# Patient Record
Sex: FEMALE | Race: BLACK OR AFRICAN AMERICAN | NOT HISPANIC OR LATINO | Employment: FULL TIME | URBAN - METROPOLITAN AREA
[De-identification: names, ages, dates, MRNs, and addresses within clinical notes are randomized per-mention and may not be internally consistent; named-entity substitution may affect disease eponyms.]

---

## 2022-05-31 ENCOUNTER — HOSPITAL ENCOUNTER (EMERGENCY)
Facility: HOSPITAL | Age: 32
Discharge: LEFT BEFORE DISCHARGE | End: 2022-05-31
Attending: EMERGENCY MEDICINE
Payer: MEDICAID

## 2022-05-31 VITALS
SYSTOLIC BLOOD PRESSURE: 162 MMHG | TEMPERATURE: 99 F | DIASTOLIC BLOOD PRESSURE: 94 MMHG | RESPIRATION RATE: 18 BRPM | OXYGEN SATURATION: 99 % | HEART RATE: 88 BPM

## 2022-05-31 DIAGNOSIS — R73.9 HYPERGLYCEMIA: ICD-10-CM

## 2022-05-31 DIAGNOSIS — N30.00 ACUTE CYSTITIS WITHOUT HEMATURIA: Primary | ICD-10-CM

## 2022-05-31 DIAGNOSIS — R07.9 CHEST PAIN: ICD-10-CM

## 2022-05-31 LAB
ALBUMIN SERPL BCP-MCNC: 3.3 G/DL (ref 3.5–5.2)
ALLENS TEST: ABNORMAL
ALP SERPL-CCNC: 76 U/L (ref 55–135)
ALT SERPL W/O P-5'-P-CCNC: 8 U/L (ref 10–44)
ANION GAP SERPL CALC-SCNC: 8 MMOL/L (ref 8–16)
AST SERPL-CCNC: 10 U/L (ref 10–40)
B-OH-BUTYR BLD STRIP-SCNC: 0.1 MMOL/L (ref 0–0.5)
BACTERIA #/AREA URNS AUTO: ABNORMAL /HPF
BASOPHILS # BLD AUTO: 0.04 K/UL (ref 0–0.2)
BASOPHILS NFR BLD: 0.7 % (ref 0–1.9)
BILIRUB SERPL-MCNC: 0.2 MG/DL (ref 0.1–1)
BILIRUB UR QL STRIP: NEGATIVE
BUN SERPL-MCNC: 17 MG/DL (ref 6–20)
CALCIUM SERPL-MCNC: 8.7 MG/DL (ref 8.7–10.5)
CHLORIDE SERPL-SCNC: 102 MMOL/L (ref 95–110)
CLARITY UR REFRACT.AUTO: ABNORMAL
CO2 SERPL-SCNC: 25 MMOL/L (ref 23–29)
COLOR UR AUTO: YELLOW
CREAT SERPL-MCNC: 1 MG/DL (ref 0.5–1.4)
DELSYS: ABNORMAL
DIFFERENTIAL METHOD: ABNORMAL
EOSINOPHIL # BLD AUTO: 0.1 K/UL (ref 0–0.5)
EOSINOPHIL NFR BLD: 2 % (ref 0–8)
ERYTHROCYTE [DISTWIDTH] IN BLOOD BY AUTOMATED COUNT: 10.6 % (ref 11.5–14.5)
EST. GFR  (AFRICAN AMERICAN): >60 ML/MIN/1.73 M^2
EST. GFR  (NON AFRICAN AMERICAN): >60 ML/MIN/1.73 M^2
GLUCOSE SERPL-MCNC: 358 MG/DL (ref 70–110)
GLUCOSE UR QL STRIP: ABNORMAL
HCO3 UR-SCNC: 26.9 MMOL/L (ref 24–28)
HCT VFR BLD AUTO: 32.5 % (ref 37–48.5)
HGB BLD-MCNC: 11.9 G/DL (ref 12–16)
HGB UR QL STRIP: ABNORMAL
HYALINE CASTS UR QL AUTO: 3 /LPF
IMM GRANULOCYTES # BLD AUTO: 0.03 K/UL (ref 0–0.04)
IMM GRANULOCYTES NFR BLD AUTO: 0.5 % (ref 0–0.5)
KETONES UR QL STRIP: NEGATIVE
LEUKOCYTE ESTERASE UR QL STRIP: ABNORMAL
LYMPHOCYTES # BLD AUTO: 1.9 K/UL (ref 1–4.8)
LYMPHOCYTES NFR BLD: 34.4 % (ref 18–48)
MCH RBC QN AUTO: 33.7 PG (ref 27–31)
MCHC RBC AUTO-ENTMCNC: 36.6 G/DL (ref 32–36)
MCV RBC AUTO: 92 FL (ref 82–98)
MICROSCOPIC COMMENT: ABNORMAL
MONOCYTES # BLD AUTO: 0.5 K/UL (ref 0.3–1)
MONOCYTES NFR BLD: 8.4 % (ref 4–15)
NEUTROPHILS # BLD AUTO: 3 K/UL (ref 1.8–7.7)
NEUTROPHILS NFR BLD: 54 % (ref 38–73)
NITRITE UR QL STRIP: NEGATIVE
NRBC BLD-RTO: 0 /100 WBC
PCO2 BLDA: 50 MMHG (ref 35–45)
PH SMN: 7.34 [PH] (ref 7.35–7.45)
PH UR STRIP: 6 [PH] (ref 5–8)
PLATELET # BLD AUTO: 355 K/UL (ref 150–450)
PMV BLD AUTO: 10 FL (ref 9.2–12.9)
PO2 BLDA: 23 MMHG (ref 40–60)
POC BE: 1 MMOL/L
POC SATURATED O2: 35 % (ref 95–100)
POC TCO2: 28 MMOL/L (ref 24–29)
POCT GLUCOSE: 435 MG/DL (ref 70–110)
POTASSIUM SERPL-SCNC: 3.8 MMOL/L (ref 3.5–5.1)
PROT SERPL-MCNC: 6.6 G/DL (ref 6–8.4)
PROT UR QL STRIP: ABNORMAL
RBC # BLD AUTO: 3.53 M/UL (ref 4–5.4)
RBC #/AREA URNS AUTO: 5 /HPF (ref 0–4)
SAMPLE: ABNORMAL
SITE: ABNORMAL
SODIUM SERPL-SCNC: 135 MMOL/L (ref 136–145)
SP GR UR STRIP: 1.02 (ref 1–1.03)
SQUAMOUS #/AREA URNS AUTO: 1 /HPF
URN SPEC COLLECT METH UR: ABNORMAL
WBC # BLD AUTO: 5.61 K/UL (ref 3.9–12.7)
WBC #/AREA URNS AUTO: 20 /HPF (ref 0–5)
YEAST UR QL AUTO: ABNORMAL

## 2022-05-31 PROCEDURE — 99900035 HC TECH TIME PER 15 MIN (STAT)

## 2022-05-31 PROCEDURE — 93005 ELECTROCARDIOGRAM TRACING: CPT

## 2022-05-31 PROCEDURE — 85025 COMPLETE CBC W/AUTO DIFF WBC: CPT | Performed by: NURSE PRACTITIONER

## 2022-05-31 PROCEDURE — 99285 EMERGENCY DEPT VISIT HI MDM: CPT | Mod: ,,, | Performed by: EMERGENCY MEDICINE

## 2022-05-31 PROCEDURE — 82010 KETONE BODYS QUAN: CPT | Performed by: NURSE PRACTITIONER

## 2022-05-31 PROCEDURE — 96360 HYDRATION IV INFUSION INIT: CPT

## 2022-05-31 PROCEDURE — 99284 EMERGENCY DEPT VISIT MOD MDM: CPT | Mod: 25

## 2022-05-31 PROCEDURE — 87186 SC STD MICRODIL/AGAR DIL: CPT | Performed by: NURSE PRACTITIONER

## 2022-05-31 PROCEDURE — 87088 URINE BACTERIA CULTURE: CPT | Performed by: NURSE PRACTITIONER

## 2022-05-31 PROCEDURE — 80053 COMPREHEN METABOLIC PANEL: CPT | Performed by: NURSE PRACTITIONER

## 2022-05-31 PROCEDURE — 87086 URINE CULTURE/COLONY COUNT: CPT | Performed by: NURSE PRACTITIONER

## 2022-05-31 PROCEDURE — 82803 BLOOD GASES ANY COMBINATION: CPT

## 2022-05-31 PROCEDURE — 81001 URINALYSIS AUTO W/SCOPE: CPT | Performed by: NURSE PRACTITIONER

## 2022-05-31 PROCEDURE — 87077 CULTURE AEROBIC IDENTIFY: CPT | Performed by: NURSE PRACTITIONER

## 2022-05-31 PROCEDURE — 82962 GLUCOSE BLOOD TEST: CPT

## 2022-05-31 PROCEDURE — 25000003 PHARM REV CODE 250: Performed by: NURSE PRACTITIONER

## 2022-05-31 PROCEDURE — 99285 PR EMERGENCY DEPT VISIT,LEVEL V: ICD-10-PCS | Mod: ,,, | Performed by: EMERGENCY MEDICINE

## 2022-05-31 RX ORDER — METFORMIN HYDROCHLORIDE 500 MG/1
500 TABLET ORAL 2 TIMES DAILY WITH MEALS
COMMUNITY

## 2022-05-31 RX ORDER — INSULIN GLARGINE 100 [IU]/ML
25 INJECTION, SOLUTION SUBCUTANEOUS DAILY
Qty: 7.5 ML | Refills: 0 | Status: SHIPPED | OUTPATIENT
Start: 2022-05-31 | End: 2022-06-30

## 2022-05-31 RX ORDER — GLIPIZIDE 5 MG/1
5 TABLET, FILM COATED, EXTENDED RELEASE ORAL
COMMUNITY

## 2022-05-31 RX ORDER — NITROFURANTOIN 25; 75 MG/1; MG/1
100 CAPSULE ORAL 2 TIMES DAILY
Qty: 10 CAPSULE | Refills: 0 | Status: SHIPPED | OUTPATIENT
Start: 2022-05-31 | End: 2022-06-05

## 2022-05-31 RX ADMIN — SODIUM CHLORIDE 1000 ML: 0.9 INJECTION, SOLUTION INTRAVENOUS at 06:05

## 2022-05-31 NOTE — ED NOTES
Patient identifiers verified and correct for MS Mcgrath  C/C: RIght sided CP SEE NN  APPEARANCE: awake and alert in NAD.  SKIN: warm, dry and intact. No breakdown or bruising.  MUSCULOSKELETAL: Patient moving all extremities spontaneously, no obvious swelling or deformities noted. Ambulates independently.  RESPIRATORY: Denies shortness of breath.Respirations unlabored.   CARDIAC: Denies CP, 2+ distal pulses; no peripheral edema  ABDOMEN: S/ND/NT, Denies nausea  : voids spontaneously, denies difficulty  Neurologic: AAO x 4; follows commands equal strength in all extremities; denies numbness/tingling. Denies dizziness  Denies weakness

## 2022-05-31 NOTE — ED NOTES
Patient identifiers verified and correct for MS Mcgrath  C/C: Right CP, elevated BG SEE NN  APPEARANCE: awake and alert in NAD.  SKIN: warm, dry and intact. No breakdown or bruising.  MUSCULOSKELETAL: Patient moving all extremities spontaneously, no obvious swelling or deformities noted. Ambulates independently.  RESPIRATORY: Denies shortness of breath.Respirations unlabored.   CARDIAC:Positive right  CP, 2+ distal pulses; no peripheral edema  ABDOMEN: ABdomen soft, reports nasuea, emesis x1 today   : voids spontaneously, denies difficulty  Neurologic: AAO x 4; follows commands equal strength in all extremities; denies numbness/tingling. Denies dizziness  Denies weakness

## 2022-05-31 NOTE — FIRST PROVIDER EVALUATION
Emergency Department TeleTriage Encounter Note      CHIEF COMPLAINT    Chief Complaint   Patient presents with    Chest Pain     Chest wall pain with cough that began about an hour ago, hyperglycemic with EMS. She reports she has been out of her insulin for about 3 days       VITAL SIGNS   Initial Vitals [05/31/22 1802]   BP Pulse Resp Temp SpO2   (!) 162/94 88 18 99 °F (37.2 °C) 99 %      MAP       --            ALLERGIES    Review of patient's allergies indicates:  No Known Allergies    PROVIDER TRIAGE NOTE  This is a teletriage evaluation of a 31 y.o. female presenting to the ED complaining of CP and cough starting today. Has been out of insulin for three days.  Glucose >400.     Will start DKA orders.     Initial orders will be placed and care will be transferred to an alternate provider when patient is roomed for a full evaluation. Any additional orders and the final disposition will be determined by that provider.           ORDERS  Labs Reviewed   CBC W/ AUTO DIFFERENTIAL   COMPREHENSIVE METABOLIC PANEL   BETA - HYDROXYBUTYRATE, SERUM   URINALYSIS, REFLEX TO URINE CULTURE   POCT URINE PREGNANCY   POCT GLUCOSE MONITORING CONTINUOUS   POCT GLUCOSE MONITORING CONTINUOUS       ED Orders (720h ago, onward)    Start Ordered     Status Ordering Provider    05/31/22 2000 05/31/22 1823  Vital signs  Every 2 hours         Ordered BUTCH PADILLA N.    05/31/22 1830 05/31/22 1823  sodium chloride 0.9% bolus 1,000 mL  ED 1 Time         Ordered BUTCH PADILLA N.    05/31/22 1824 05/31/22 1823  Cardiac Monitoring - Adult  Continuous         Ordered BUTCH PADILLA N.    05/31/22 1824 05/31/22 1823  Pulse Oximetry Continuous  Continuous         Ordered BUTCH PADILLA N.    05/31/22 1824 05/31/22 1823  Saline lock IV  Once         Ordered BUTCH PADILLA N.    05/31/22 1824 05/31/22 1823  CBC auto differential  STAT         Ordered BUTCH PADILLA N.    05/31/22 1824 05/31/22 1823   Comprehensive metabolic panel  STAT         Ordered BUTCH PADILLA N.    05/31/22 1824 05/31/22 1823  Beta - Hydroxybutyrate, Serum  STAT         Ordered BUTCH PADILLA N.    05/31/22 1824 05/31/22 1823  POCT glucose  Once         Ordered BUTCH PADILLA N.    05/31/22 1824 05/31/22 1823  Urinalysis, Reflex to Urine Culture Urine, Clean Catch  STAT         Ordered BUTCH PADILLA N.    05/31/22 1824 05/31/22 1823  EKG 12-lead  Once         Ordered BUTCH PADILLA N.    05/31/22 1824 05/31/22 1823  X-Ray Chest AP Portable  1 time imaging         Ordered BUTCH PADILLA N.    05/31/22 1824 05/31/22 1823  POCT Venous Blood Gas Once  Once        Comments: This test should be used for VBGs.  If using this order for other tests (K, creatinine, HCT, PT/INR, lactate etc)  ONLY do so in the case of an emergency or rapid response.Notify Physician if: see parameters below.      Ordered BUTCH PADILLA N.    05/31/22 1824 05/31/22 1823  POCT urine pregnancy  Once         Ordered BUTCH PADILLA N.    05/31/22 1820 05/31/22 1819  POCT glucose  Once         Ordered APOLINAR LÓPEZ    05/31/22 1806 05/31/22 1805  EKG 12-lead  Once         Acknowledged APOLINAR LÓPEZ            Virtual Visit Note: The provider triage portion of this emergency department evaluation and documentation was performed via [employer], a HIPAA-compliant telemedicine application, in concert with a tele-presenter in the room. A face to face patient evaluation with one of my colleagues will occur once the patient is placed in an emergency department room.      DISCLAIMER: This note was prepared with [employer] voice recognition transcription software. Garbled syntax, mangled pronouns, and other bizarre constructions may be attributed to that software system.

## 2022-05-31 NOTE — ED PROVIDER NOTES
Encounter Date: 5/31/2022       History     Chief Complaint   Patient presents with    Chest Pain     Chest wall pain with cough that began about an hour ago, hyperglycemic with EMS. She reports she has been out of her insulin for about 3 days     A 31-year-old female past medical history of diabetes presenting today with chest pain and cough that started approximately 1 hour ago while at work.  Pain was located in the right side of the chest which radiated under her arm.  States the pain was constant, sharp stabbing, worse with movement, better with rest.  There were called EMS, they gave her IV fluids and she reported improvement in symptoms.  she reports her sugars have been elevated, has not taken her insulin in 3 days.  No recent fevers or chills.        Review of patient's allergies indicates:  No Known Allergies  Past Medical History:   Diagnosis Date    Diabetes mellitus      History reviewed. No pertinent surgical history.  History reviewed. No pertinent family history.  Social History     Tobacco Use    Smoking status: Never Smoker    Smokeless tobacco: Never Used   Substance Use Topics    Alcohol use: Not Currently    Drug use: Yes     Types: Marijuana     Comment: none x 3 days     Review of Systems   Constitutional: Negative for chills and fever.   HENT: Negative for sore throat.    Respiratory: Positive for cough. Negative for shortness of breath.    Cardiovascular: Positive for chest pain. Negative for palpitations.   Gastrointestinal: Negative for abdominal pain, nausea and vomiting.   Genitourinary: Negative for dysuria and hematuria.   Musculoskeletal: Negative for back pain.   Skin: Negative for rash.   Neurological: Positive for weakness.   Hematological: Does not bruise/bleed easily.       Physical Exam     Initial Vitals [05/31/22 1802]   BP Pulse Resp Temp SpO2   (!) 162/94 88 18 99 °F (37.2 °C) 99 %      MAP       --         Physical Exam    Nursing note and vitals  reviewed.  Constitutional: She appears well-developed and well-nourished. No distress.   HENT:   Mouth/Throat: Oropharynx is clear and moist.   Eyes: Conjunctivae are normal. No scleral icterus.   Neck: No JVD present.   Cardiovascular: Normal rate, regular rhythm and intact distal pulses.   Pulmonary/Chest: Breath sounds normal. No respiratory distress. She has no wheezes. She has no rales. She exhibits no tenderness.   Abdominal: Abdomen is soft. Bowel sounds are normal. She exhibits no distension. There is no abdominal tenderness. There is no rebound.   Musculoskeletal:         General: No edema.     Lymphadenopathy:     She has no cervical adenopathy.   Neurological: She is alert and oriented to person, place, and time.   Skin: Skin is warm. No rash noted.         ED Course   Procedures  Labs Reviewed   CBC W/ AUTO DIFFERENTIAL - Abnormal; Notable for the following components:       Result Value    RBC 3.53 (*)     Hemoglobin 11.9 (*)     Hematocrit 32.5 (*)     MCH 33.7 (*)     MCHC 36.6 (*)     RDW 10.6 (*)     All other components within normal limits   COMPREHENSIVE METABOLIC PANEL - Abnormal; Notable for the following components:    Sodium 135 (*)     Glucose 358 (*)     Albumin 3.3 (*)     ALT 8 (*)     All other components within normal limits   URINALYSIS, REFLEX TO URINE CULTURE - Abnormal; Notable for the following components:    Appearance, UA Hazy (*)     Protein, UA 2+ (*)     Glucose, UA 3+ (*)     Occult Blood UA 1+ (*)     Leukocytes, UA 1+ (*)     All other components within normal limits    Narrative:     Specimen Source->Urine   URINALYSIS MICROSCOPIC - Abnormal; Notable for the following components:    RBC, UA 5 (*)     WBC, UA 20 (*)     Bacteria Few (*)     Hyaline Casts, UA 3 (*)     All other components within normal limits    Narrative:     Specimen Source->Urine   POCT GLUCOSE - Abnormal; Notable for the following components:    POCT Glucose 435 (*)     All other components within  normal limits   ISTAT PROCEDURE - Abnormal; Notable for the following components:    POC PH 7.339 (*)     POC PCO2 50.0 (*)     POC PO2 23 (*)     POC SATURATED O2 35 (*)     All other components within normal limits   CULTURE, URINE   BETA - HYDROXYBUTYRATE, SERUM   POCT URINE PREGNANCY   POCT GLUCOSE MONITORING CONTINUOUS   POCT GLUCOSE MONITORING CONTINUOUS     EKG Readings: (Independently Interpreted)   EKG:  Sinus rhythm at 84 with PVCs       Imaging Results          X-Ray Chest AP Portable (Final result)  Result time 05/31/22 19:39:40    Final result by Gustavo Thompson MD (05/31/22 19:39:40)                 Impression:      No acute intrathoracic abnormality.    Electronically signed by resident: Pedro Pablo Hewitt  Date:    05/31/2022  Time:    19:34    Electronically signed by: Gustavo Thompson MD  Date:    05/31/2022  Time:    19:39             Narrative:    EXAMINATION:  XR CHEST AP PORTABLE    CLINICAL HISTORY:  hyperglycemia;    TECHNIQUE:  Single frontal view of the chest was performed.    COMPARISON:  No relevant prior imaging for comparison.    FINDINGS:  The lungs are clear, with normal appearance of pulmonary vasculature and no pleural effusion or pneumothorax.    The cardiac silhouette is normal in size. The hilar and mediastinal contours are unremarkable.    Bones are intact.                                 Medications   sodium chloride 0.9% bolus 1,000 mL (0 mLs Intravenous Stopped 5/31/22 1930)     Medical Decision Making:   History:   Old Medical Records: I decided to obtain old medical records.  Initial Assessment:   Emergent evaluation 31-year-old female presenting today with right-sided chest pain, hyperglycemia    Hypertensive otherwise vital signs are stable  Differential Diagnosis:   Hyperglycemia due to medication noncompliance, infection.  Also consider pneumonia, electrolyte derangement, costochondritis.  Low suspicion for ACS given her current history  Clinical Tests:   Lab Tests: Reviewed and  Ordered  Medical Tests: Ordered and Reviewed  ED Management:  - labs  - EKG  - IVF             ED Course as of 05/31/22 2037   Tue May 31, 2022   1932 POC PH(!): 7.339 [GM]   1932 POC PCO2(!): 50.0 [GM]   1932 POC PO2(!): 23 [GM]   1932 Hemoglobin(!): 11.9 [GM]   1932 Glucose(!): 358 [GM]   1932 BUN: 17 [GM]   1932 Creatinine: 1.0 [GM]   1953 Beta-Hydroxybutyrate: 0.1 [GM]   2027 WBC, UA(!): 20 [GM]   2027 Bacteria, UA(!): Few [GM]   2035 Labs review with no leukocytosis.  Hemoglobin stable.  chemistry concerning for hyperglycemia without anion gap.  Normal beta hydroxybutyrate.    UA did show few bacteria, 20 WBCs, nitrite negative.    Thank her hyperglycemia secondary to medication noncompliance.  Refilled prescription for Lantus.  Also provided prescription for Macrobid x3 days.    I went to discharge the patient, noted by the nurse that she has eloped from the emergency department prior to receiving her discharge instructions.  I have asked the nurse to call the patient to let her know that she has prescriptions to .     [GM]      ED Course User Index  [GM] Donna Friedman MD             Clinical Impression:   Final diagnoses:  [R07.9] Chest pain  [R73.9] Hyperglycemia                 Donna Friedman MD  05/31/22 2037

## 2022-05-31 NOTE — ED NOTES
Patient arrives via EMS, Patient states right sided CP onset 1 1/2 hours PTA, no diabetic meds x 2 days, Had IVF per EMS

## 2022-06-01 NOTE — DISCHARGE INSTRUCTIONS
Prescription for Lantus has been printed.  Please fill the prescription immediately.  Follow-up with a primary doctor or establish care with Internal Medicine

## 2022-06-03 LAB — BACTERIA UR CULT: ABNORMAL
